# Patient Record
Sex: MALE | URBAN - METROPOLITAN AREA
[De-identification: names, ages, dates, MRNs, and addresses within clinical notes are randomized per-mention and may not be internally consistent; named-entity substitution may affect disease eponyms.]

---

## 2020-11-18 ENCOUNTER — OFFICE VISIT (OUTPATIENT)
Dept: URBAN - METROPOLITAN AREA CLINIC 98 | Facility: CLINIC | Age: 50
End: 2020-11-18

## 2020-11-25 ENCOUNTER — WEB ENCOUNTER (OUTPATIENT)
Dept: URBAN - METROPOLITAN AREA CLINIC 78 | Facility: CLINIC | Age: 50
End: 2020-11-25

## 2020-12-01 ENCOUNTER — OFFICE VISIT (OUTPATIENT)
Dept: URBAN - METROPOLITAN AREA CLINIC 78 | Facility: CLINIC | Age: 50
End: 2020-12-01

## 2020-12-07 ENCOUNTER — DASHBOARD ENCOUNTERS (OUTPATIENT)
Age: 50
End: 2020-12-07

## 2020-12-07 ENCOUNTER — OFFICE VISIT (OUTPATIENT)
Dept: URBAN - METROPOLITAN AREA TELEHEALTH 2 | Facility: TELEHEALTH | Age: 50
End: 2020-12-07
Payer: COMMERCIAL

## 2020-12-07 DIAGNOSIS — E03.9 HYPOTHYROIDISM: ICD-10-CM

## 2020-12-07 DIAGNOSIS — M30.0 PAN (POLYARTERITIS NODOSA): ICD-10-CM

## 2020-12-07 DIAGNOSIS — B18.1 CARRIER OF HEPATITIS B: ICD-10-CM

## 2020-12-07 DIAGNOSIS — R18.8 ASCITES: ICD-10-CM

## 2020-12-07 DIAGNOSIS — I10 HTN (HYPERTENSION): ICD-10-CM

## 2020-12-07 DIAGNOSIS — I25.9 CAD (CORONARY ARTERY DISEASE): ICD-10-CM

## 2020-12-07 DIAGNOSIS — B16.9 HBV (HEPATITIS B VIRUS) INFECTION: ICD-10-CM

## 2020-12-07 DIAGNOSIS — N18.6 ESRD (END STAGE RENAL DISEASE) ON DIALYSIS: ICD-10-CM

## 2020-12-07 DIAGNOSIS — R10.84 ABDOMINAL PAIN, GENERALIZED: ICD-10-CM

## 2020-12-07 DIAGNOSIS — E78.5 HLD (HYPERLIPIDEMIA): ICD-10-CM

## 2020-12-07 PROBLEM — 40930008 HYPOTHYROIDISM: Status: ACTIVE | Noted: 2020-12-07

## 2020-12-07 PROBLEM — 53741008 CORONARY ARTERY DISEASE: Status: ACTIVE | Noted: 2020-12-07

## 2020-12-07 PROBLEM — 389026000 ASCITES: Status: ACTIVE | Noted: 2020-12-07

## 2020-12-07 PROBLEM — 38341003 HYPERTENSION: Status: ACTIVE | Noted: 2020-12-07

## 2020-12-07 PROBLEM — 155441006 POLYARTERITIS NODOSA: Status: ACTIVE | Noted: 2020-12-07

## 2020-12-07 PROBLEM — 46177005 END STAGE RENAL DISEASE: Status: ACTIVE | Noted: 2020-12-07

## 2020-12-07 PROBLEM — 55822004 HYPERLIPIDAEMIA: Status: ACTIVE | Noted: 2020-12-07

## 2020-12-07 PROCEDURE — 99205 OFFICE O/P NEW HI 60 MIN: CPT | Performed by: INTERNAL MEDICINE

## 2020-12-07 RX ORDER — SODIUM, POTASSIUM,MAG SULFATES 17.5-3.13G
177 ML SOLUTION, RECONSTITUTED, ORAL ORAL
Qty: 354 ML | Refills: 0 | OUTPATIENT
Start: 2021-03-10 | End: 2021-03-11

## 2020-12-07 NOTE — HPI-OTHER HISTORIES
[facility] COURSE:   Mr. Murdock is a 50-year-old gentleman with  history of HTN, CAD s/p stent, end-stage renal disease, who used to be on peritoneal dialysis, had his dialysis catheter removed on 10/22, and then began to have abdominal pain. he feels that oral  Dilaudid will control his abdominal pain and he has to come to the emergency department for IV Dilaudid.  He was given IV Dilaudid in the ER, but the pain returned and hence, he was admitted for further management.  He got a CT abdomen without contrast in the ER, which showed diffuse small bowel dilation, favored to represent ileus, but no signs of small bowel obstruction.  He was on constipation medications and he was given x-ray small bowel series follow  through and once he got that x-ray, he actually had multiple bowel movements and felt normal.  He was treated with IV Dilaudid througho ut his stay here and felt comfortable.  At the time of discharge, he was given Percocet for 5 days to home.  He was also getting hemodialysis sessions during his stay here for his end-stage renal disease.    Other hospital problems Ileus : Probably from opioid meds use vs functional , advised not to use opioids  Seen general surgery inpatient, advised to follow them outpatient  ESRD : On Hemodialysis through Left AV fistula . Advised to follow outpatient Nephrology, resumed sevelamer, folic acid, calcitriol   HTN - His blood pressure was controlled on the BP medications. Resumed amlodipine along with clonidine and nebivolol  Hypothyroidism :  He was given levothyroxine for hypothyroidism   Coronary disease s/p stent in past : resumed aspirin and statin along with ticagrelor on discharge  ------------------------------------------------------------------------------------------------------  [facility] COURSE:                    A 50-year-old male with a medical history of end-stage renal disease on hemodialysis Monday, Wednesday, Fridays, who presented on 10/19 complaining of abdominal pain for 2 days associated with nausea and vomiting.  The patient was hospitalized for further workup.  PROBLEM LIST: 1. 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  He was initially     with a peritoneal catheter present on admission.  MRI of abdomen shows an     encapsulating appearing distal small bowel loops in the right lower     quadrant with mild dilated upstream distal small bowel loops.  Findings are     similar to prior CT studies.  The patient is status post diagnostic     laparoscopy with peritoneal biopsy and evacuation of intraabdominal fluid     and ascites with removal of peritoneal catheter on 10/22.  Pathology     reported fibrous tissue demonstrated increased vascularity along with a few     histiocytes and a few lymphocytes.  The patient is also status post     endoscopy that shows a normal esophagus with normal stomach and found a     single bleeding angioectasia in the duodenum, injected, and clip was     placed.  He was treated with cefepime and vancomycin.  He was evaluated for     General Surgery, Dr. Oren Perkins, and Dr. Nicholson from Gastroenterology.  The     recommendation is that he needs a gastric emptying study as outpatient and     a colonoscopy.  The patient has the information to follow up with that. 2. Sepsis on admission with fever up to 38.8, hypertension, leukocytosis,     elevated procalcitonin of 0.93 with lactic acid of 0.6.  Coronavirus     Disease 2019 was negative.  Blood cultures so far have been negative.     Peritoneal fluid is also negative.  The patient is going home with no     antibiotics at this moment. 3. End-stage renal disease, on hemodialysis Monday, Wednesday, Fridays with     associated hyperphosphatemia.  He is on sevelamer.  To note, this patient     refused some of the medication while he was here at the hospital.  The last     hemodialysis was on 10/23, and he was evaluated for Nephrology, Dr. Romero. 4. Hypertension.  Initially with hypertension.  Currently, blood pressure is     better controlled.  He is on amlodipine 10 mg p.o. daily and clonidine 0.1     mg p.o. b.i.d. 5. Dyslipidemia.  He is on atorvastatin 80 mg p.o. daily. 6. Hypothyroid disease.  He is on Synthroid 50 mcg p.o. daily. 7. History of polyarteritis nodosa.  He is not on medication. 8. Electrolyte disorder with hyponatremia, hyperkalemia, hyperphosphatemia.     Hyponatremia is resolved.  Hyperkalemia is resolved, and he has history of     hyperphosphatemia, secondary to end-stage renal disease. 9. Anemia of chronic disease with associated thrombocytopenia.  Anemia panel     shows low iron, low total iron binding capacity, high ferritin with normal     folate, and high vitamin B12. 10.BPH.  He is on tamsulosin 0.4 mg p.o. daily. 11.Constipation, likely secondary to partial small bowel obstruction.  He is     on docusate, MiraLAX, and lactulose.

## 2020-12-07 NOTE — HPI-TODAY'S VISIT:
Patient presents for a f/u He says he continues to have abdominal pain Recent hospitalizations, EGD, labs and imagings were d/w pt  HIS PD cath was removed on 10/22 2 weeks later he had a paracentesis with 4.5 liters drained  He has a h/o HBV - diagnosed 20 years ago

## 2020-12-08 ENCOUNTER — WEB ENCOUNTER (OUTPATIENT)
Dept: URBAN - METROPOLITAN AREA CLINIC 78 | Facility: CLINIC | Age: 50
End: 2020-12-08

## 2021-03-10 ENCOUNTER — LAB OUTSIDE AN ENCOUNTER (OUTPATIENT)
Dept: URBAN - METROPOLITAN AREA TELEHEALTH 2 | Facility: TELEHEALTH | Age: 51
End: 2021-03-10

## 2024-01-01 NOTE — PHYSICAL EXAM HENT:
Head , normocephalic , atraumatic , Face , Face within normal limits , Ears , External ears within normal limits , Nose/Nasopharynx , External nose  normal appearance , Mouth and Throat , Oral cavity appearance normal
immune